# Patient Record
Sex: FEMALE | Race: WHITE | NOT HISPANIC OR LATINO
[De-identification: names, ages, dates, MRNs, and addresses within clinical notes are randomized per-mention and may not be internally consistent; named-entity substitution may affect disease eponyms.]

---
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
## 2019-04-22 NOTE — ED PEDIATRIC NURSE NOTE - THOUGHTS OF HOMICIDE/VIOLENCE TOWARDS OTHERS YN, MLM
Patient's lab order for glycohemoglobin that was ordered on 8-17-18 and expected approximately 8-18-19 was released in error by Mammogram Department.  Replaced lab order.  
No

## 2022-06-19 ENCOUNTER — NON-APPOINTMENT (OUTPATIENT)
Age: 22
End: 2022-06-19

## 2022-07-04 PROBLEM — Z00.00 ENCOUNTER FOR PREVENTIVE HEALTH EXAMINATION: Status: ACTIVE | Noted: 2022-07-04

## 2022-07-05 ENCOUNTER — NON-APPOINTMENT (OUTPATIENT)
Age: 22
End: 2022-07-05

## 2022-07-06 ENCOUNTER — APPOINTMENT (OUTPATIENT)
Dept: OBGYN | Facility: CLINIC | Age: 22
End: 2022-07-06

## 2022-07-06 ENCOUNTER — TRANSCRIPTION ENCOUNTER (OUTPATIENT)
Age: 22
End: 2022-07-06

## 2022-07-06 ENCOUNTER — NON-APPOINTMENT (OUTPATIENT)
Age: 22
End: 2022-07-06

## 2022-07-06 VITALS
BODY MASS INDEX: 21.69 KG/M2 | SYSTOLIC BLOOD PRESSURE: 126 MMHG | DIASTOLIC BLOOD PRESSURE: 79 MMHG | HEIGHT: 66 IN | WEIGHT: 135 LBS

## 2022-07-06 DIAGNOSIS — Z01.419 ENCOUNTER FOR GYNECOLOGICAL EXAMINATION (GENERAL) (ROUTINE) W/OUT ABNORMAL FINDINGS: ICD-10-CM

## 2022-07-06 DIAGNOSIS — Z11.3 ENCOUNTER FOR SCREENING FOR INFECTIONS WITH A PREDOMINANTLY SEXUAL MODE OF TRANSMISSION: ICD-10-CM

## 2022-07-06 PROCEDURE — 99385 PREV VISIT NEW AGE 18-39: CPT

## 2022-07-06 PROCEDURE — 36415 COLL VENOUS BLD VENIPUNCTURE: CPT

## 2022-07-07 LAB
C TRACH RRNA SPEC QL NAA+PROBE: NOT DETECTED
HBV SURFACE AG SER QL: NONREACTIVE
HCV AB SER QL: NONREACTIVE
HCV S/CO RATIO: 0.11 S/CO
HIV1+2 AB SPEC QL IA.RAPID: NONREACTIVE
N GONORRHOEA RRNA SPEC QL NAA+PROBE: NOT DETECTED
SOURCE TP AMPLIFICATION: NORMAL
T PALLIDUM AB SER QL IA: NEGATIVE

## 2022-07-10 LAB — CYTOLOGY CVX/VAG DOC THIN PREP: ABNORMAL

## 2022-07-31 ENCOUNTER — NON-APPOINTMENT (OUTPATIENT)
Age: 22
End: 2022-07-31

## 2023-03-09 ENCOUNTER — OFFICE (OUTPATIENT)
Dept: URBAN - METROPOLITAN AREA CLINIC 27 | Facility: CLINIC | Age: 23
Setting detail: OPHTHALMOLOGY
End: 2023-03-09
Payer: COMMERCIAL

## 2023-03-09 DIAGNOSIS — H16.223: ICD-10-CM

## 2023-03-09 DIAGNOSIS — H40.013: ICD-10-CM

## 2023-03-09 DIAGNOSIS — H52.13: ICD-10-CM

## 2023-03-09 PROCEDURE — 92014 COMPRE OPH EXAM EST PT 1/>: CPT | Performed by: OPHTHALMOLOGY

## 2023-03-09 ASSESSMENT — KERATOMETRY
OD_K2POWER_DIOPTERS: 43.75
METHOD_AUTO_MANUAL: AUTO
OS_K1POWER_DIOPTERS: 42.75
OD_AXISANGLE_DEGREES: 75
OS_K2POWER_DIOPTERS: 43.50
OS_AXISANGLE_DEGREES: 114
OD_K1POWER_DIOPTERS: 43.00

## 2023-03-09 ASSESSMENT — REFRACTION_CURRENTRX
OD_CYLINDER: SPH
OS_VPRISM_DIRECTION: SV
OS_CYLINDER: SPH
OD_OVR_VA: 20/
OD_SPHERE: -2.25
OS_SPHERE: -2.25
OS_OVR_VA: 20/
OD_VPRISM_DIRECTION: SV

## 2023-03-09 ASSESSMENT — REFRACTION_MANIFEST
OS_SPHERE: -2.00
OD_CYLINDER: +0.50
OD_SPHERE: -1.75
OS_CYLINDER: +0.50
OS_CYLINDER: SPH
OS_VA1: 20/15-
OD_AXIS: 15
OD_CYLINDER: SPH
OD_CYLINDER: SPH
OS_AXIS: 150
OS_SPHERE: -2.25
OD_CYLINDER: +0.50
OS_SPHERE: -1.50
OS_CYLINDER: +0.75
OS_SPHERE: -1.50
OD_VA1: 20/15
OD_VA1: 20/15
OD_VA1: 20/15-2
OS_CYLINDER: SPH
OD_SPHERE: -1.75
OS_VA1: 20/15-2
OS_VA1: 20/15-
OS_AXIS: 150
OD_CYLINDER: +0.50
OD_SPHERE: -1.50
OD_AXIS: 015
OD_SPHERE: -1.75
OD_SPHERE: -1.75
OS_AXIS: 130
OD_AXIS: 025
OS_CYLINDER: +0.75
OS_SPHERE: -1.75

## 2023-03-09 ASSESSMENT — REFRACTION_AUTOREFRACTION
OS_AXIS: 144
OS_AXIS: 140
OD_SPHERE: -1.50
OD_SPHERE: -1.50
OS_SPHERE: -1.25
OS_CYLINDER: +0.50
OD_AXIS: 020
OS_CYLINDER: +0.50
OS_SPHERE: -1.25
OD_AXIS: 024
OD_CYLINDER: +0.50
OD_CYLINDER: +0.50

## 2023-03-09 ASSESSMENT — SPHEQUIV_DERIVED
OD_SPHEQUIV: -1.5
OD_SPHEQUIV: -1.25
OS_SPHEQUIV: -1
OD_SPHEQUIV: -1.25
OS_SPHEQUIV: -1.625
OS_SPHEQUIV: -1.125
OS_SPHEQUIV: -1
OS_SPHEQUIV: -1.25
OD_SPHEQUIV: -1.5
OD_SPHEQUIV: -1.5

## 2023-03-09 ASSESSMENT — TONOMETRY
OD_IOP_MMHG: 15
OS_IOP_MMHG: 15

## 2023-03-09 ASSESSMENT — AXIALLENGTH_DERIVED
OD_AL: 24.1371
OS_AL: 24.1313
OS_AL: 24.2336
OD_AL: 24.2395
OD_AL: 24.2395
OS_AL: 24.1824
OS_AL: 24.1313
OD_AL: 24.2395
OS_AL: 24.3888
OD_AL: 24.1371

## 2023-03-09 ASSESSMENT — CONFRONTATIONAL VISUAL FIELD TEST (CVF)
OD_FINDINGS: FULL
OS_FINDINGS: FULL

## 2023-03-09 ASSESSMENT — VISUAL ACUITY
OS_BCVA: 20/20
OD_BCVA: 20/20-1

## 2023-03-09 ASSESSMENT — SUPERFICIAL PUNCTATE KERATITIS (SPK)
OD_SPK: T
OS_SPK: T

## 2023-03-15 ENCOUNTER — OTHER LOCATION (OUTPATIENT)
Dept: URBAN - METROPOLITAN AREA LASIK CENTER 4 | Facility: LASIK CENTER | Age: 23
Setting detail: OPHTHALMOLOGY
End: 2023-03-15

## 2023-03-15 DIAGNOSIS — H52.13: ICD-10-CM

## 2023-03-15 PROCEDURE — 65760 KERATOMILEUSIS: CPT | Performed by: OPHTHALMOLOGY

## 2023-03-17 ENCOUNTER — RX ONLY (RX ONLY)
Age: 23
End: 2023-03-17

## 2023-03-17 ENCOUNTER — OFFICE (OUTPATIENT)
Dept: URBAN - METROPOLITAN AREA CLINIC 27 | Facility: CLINIC | Age: 23
Setting detail: OPHTHALMOLOGY
End: 2023-03-17
Payer: COMMERCIAL

## 2023-03-17 DIAGNOSIS — H40.013: ICD-10-CM

## 2023-03-17 DIAGNOSIS — H16.223: ICD-10-CM

## 2023-03-17 DIAGNOSIS — H52.13: ICD-10-CM

## 2023-03-17 PROCEDURE — 99024 POSTOP FOLLOW-UP VISIT: CPT | Performed by: OPHTHALMOLOGY

## 2023-03-17 ASSESSMENT — REFRACTION_AUTOREFRACTION
OS_CYLINDER: +0.50
OD_CYLINDER: +0.25
OD_SPHERE: +0.25
OS_SPHERE: PLANO
OD_SPHERE: -1.50
OS_CYLINDER: +SPHERE
OD_CYLINDER: +0.50
OS_AXIS: 140
OD_AXIS: 178
OS_SPHERE: -1.25
OD_AXIS: 024

## 2023-03-17 ASSESSMENT — REFRACTION_MANIFEST
OD_CYLINDER: +0.50
OD_CYLINDER: +0.50
OS_VA1: 20/15-
OS_SPHERE: -1.50
OS_SPHERE: -1.75
OS_SPHERE: -1.50
OD_SPHERE: -1.75
OS_AXIS: 130
OS_VA1: 20/15-2
OD_CYLINDER: SPH
OS_CYLINDER: +0.75
OS_SPHERE: -2.00
OS_CYLINDER: SPH
OS_CYLINDER: +0.75
OD_AXIS: 025
OD_SPHERE: -1.75
OD_VA1: 20/15-2
OS_CYLINDER: +0.50
OD_CYLINDER: SPH
OD_AXIS: 015
OS_VA1: 20/15-
OD_CYLINDER: +0.50
OD_SPHERE: -1.75
OS_AXIS: 150
OD_AXIS: 15
OS_AXIS: 150
OD_SPHERE: -1.75
OS_CYLINDER: SPH
OD_SPHERE: -1.50
OD_VA1: 20/15
OS_SPHERE: -2.25
OD_VA1: 20/15

## 2023-03-17 ASSESSMENT — SPHEQUIV_DERIVED
OD_SPHEQUIV: -1.5
OD_SPHEQUIV: -1.5
OS_SPHEQUIV: -1.625
OS_SPHEQUIV: -1
OS_SPHEQUIV: -1.125
OS_SPHEQUIV: -1.25
OD_SPHEQUIV: 0.375
OD_SPHEQUIV: -1.25
OD_SPHEQUIV: -1.5

## 2023-03-17 ASSESSMENT — KERATOMETRY
OS_K2POWER_DIOPTERS: 42.25
OD_K2POWER_DIOPTERS: 41.75
OS_AXISANGLE_DEGREES: 103
OS_K1POWER_DIOPTERS: 41.00
OD_AXISANGLE_DEGREES: 89
OD_K1POWER_DIOPTERS: 41.25
METHOD_AUTO_MANUAL: AUTO

## 2023-03-17 ASSESSMENT — REFRACTION_CURRENTRX
OS_CYLINDER: SPH
OS_OVR_VA: 20/
OS_VPRISM_DIRECTION: SV
OD_SPHERE: -2.25
OD_OVR_VA: 20/
OS_SPHERE: -2.25
OD_CYLINDER: SPH
OD_VPRISM_DIRECTION: SV

## 2023-03-17 ASSESSMENT — AXIALLENGTH_DERIVED
OD_AL: 24.8802
OD_AL: 24.989
OS_AL: 24.7219
OD_AL: 24.1955
OD_AL: 24.989
OS_AL: 24.7755
OD_AL: 24.989
OS_AL: 24.9922
OS_AL: 24.8293

## 2023-03-17 ASSESSMENT — VISUAL ACUITY
OS_BCVA: 20/20
OD_BCVA: 20/20

## 2023-03-17 ASSESSMENT — TONOMETRY
OS_IOP_MMHG: 14
OD_IOP_MMHG: 14

## 2023-03-17 ASSESSMENT — SUPERFICIAL PUNCTATE KERATITIS (SPK)
OD_SPK: T
OS_SPK: T

## 2023-03-23 ENCOUNTER — OFFICE (OUTPATIENT)
Dept: URBAN - METROPOLITAN AREA CLINIC 27 | Facility: CLINIC | Age: 23
Setting detail: OPHTHALMOLOGY
End: 2023-03-23
Payer: COMMERCIAL

## 2023-03-23 DIAGNOSIS — H40.013: ICD-10-CM

## 2023-03-23 DIAGNOSIS — H52.13: ICD-10-CM

## 2023-03-23 DIAGNOSIS — H16.223: ICD-10-CM

## 2023-03-23 PROCEDURE — 99024 POSTOP FOLLOW-UP VISIT: CPT | Performed by: OPHTHALMOLOGY

## 2023-03-23 ASSESSMENT — REFRACTION_AUTOREFRACTION
OD_SPHERE: PLANO
OS_SPHERE: PLANO
OS_AXIS: 159
OD_CYLINDER: +0.50
OD_AXIS: 024
OD_AXIS: 029
OD_SPHERE: -1.50
OS_CYLINDER: +0.50
OS_CYLINDER: +0.50
OS_AXIS: 140
OD_CYLINDER: +0.25
OS_SPHERE: -1.25

## 2023-03-23 ASSESSMENT — REFRACTION_MANIFEST
OD_SPHERE: -1.75
OD_CYLINDER: +0.50
OS_CYLINDER: +0.75
OD_SPHERE: -1.75
OS_CYLINDER: SPH
OS_SPHERE: -1.50
OS_AXIS: 150
OS_VA1: 20/15-
OD_VA1: 20/15
OS_CYLINDER: SPH
OD_VA1: 20/15
OD_SPHERE: -1.50
OS_SPHERE: -2.00
OD_CYLINDER: SPH
OS_VA1: 20/15-
OS_SPHERE: -2.25
OD_SPHERE: -1.75
OD_AXIS: 015
OS_AXIS: 150
OD_AXIS: 025
OD_VA1: 20/15-2
OS_AXIS: 130
OD_AXIS: 15
OS_CYLINDER: +0.75
OD_CYLINDER: +0.50
OD_CYLINDER: SPH
OS_SPHERE: -1.75
OD_SPHERE: -1.75
OS_CYLINDER: +0.50
OS_VA1: 20/15-2
OD_CYLINDER: +0.50
OS_SPHERE: -1.50

## 2023-03-23 ASSESSMENT — AXIALLENGTH_DERIVED
OS_AL: 25.1477
OS_AL: 24.9283
OD_AL: 25.0407
OD_AL: 25.0407
OD_AL: 24.9314
OS_AL: 24.9828
OD_AL: 25.0407
OS_AL: 24.874

## 2023-03-23 ASSESSMENT — KERATOMETRY
OS_K2POWER_DIOPTERS: 41.75
OS_K1POWER_DIOPTERS: 40.75
OD_K2POWER_DIOPTERS: 41.75
OD_K1POWER_DIOPTERS: 41.00
OD_AXISANGLE_DEGREES: 086
OS_AXISANGLE_DEGREES: 118
METHOD_AUTO_MANUAL: AUTO

## 2023-03-23 ASSESSMENT — SPHEQUIV_DERIVED
OS_SPHEQUIV: -1
OS_SPHEQUIV: -1.625
OD_SPHEQUIV: -1.5
OS_SPHEQUIV: -1.125
OD_SPHEQUIV: -1.5
OD_SPHEQUIV: -1.25
OD_SPHEQUIV: -1.5
OS_SPHEQUIV: -1.25

## 2023-03-23 ASSESSMENT — SUPERFICIAL PUNCTATE KERATITIS (SPK)
OS_SPK: T
OD_SPK: T

## 2023-03-23 ASSESSMENT — REFRACTION_CURRENTRX
OS_OVR_VA: 20/
OS_VPRISM_DIRECTION: SV
OS_CYLINDER: SPH
OS_SPHERE: -2.25
OD_SPHERE: -2.25
OD_OVR_VA: 20/
OD_CYLINDER: SPH
OD_VPRISM_DIRECTION: SV

## 2023-03-23 ASSESSMENT — VISUAL ACUITY
OD_BCVA: 20/20
OS_BCVA: 20/20

## 2023-03-23 ASSESSMENT — TONOMETRY
OD_IOP_MMHG: 12
OS_IOP_MMHG: 13
OD_IOP_MMHG: 14
OS_IOP_MMHG: 12

## 2023-04-19 ENCOUNTER — OFFICE (OUTPATIENT)
Dept: URBAN - METROPOLITAN AREA CLINIC 27 | Facility: CLINIC | Age: 23
Setting detail: OPHTHALMOLOGY
End: 2023-04-19
Payer: COMMERCIAL

## 2023-04-19 DIAGNOSIS — H16.223: ICD-10-CM

## 2023-04-19 DIAGNOSIS — H52.13: ICD-10-CM

## 2023-04-19 DIAGNOSIS — H40.013: ICD-10-CM

## 2023-04-19 PROCEDURE — 99024 POSTOP FOLLOW-UP VISIT: CPT | Performed by: OPHTHALMOLOGY

## 2023-04-19 ASSESSMENT — REFRACTION_AUTOREFRACTION
OD_CYLINDER: +0.50
OS_CYLINDER: +0.25
OS_AXIS: 121
OD_AXIS: 024
OD_SPHERE: -1.50
OD_AXIS: 59
OS_SPHERE: PLANO
OD_SPHERE: -0.25
OS_SPHERE: -1.25
OS_CYLINDER: +0.50
OS_AXIS: 140
OD_CYLINDER: +0.25

## 2023-04-19 ASSESSMENT — REFRACTION_MANIFEST
OD_CYLINDER: SPH
OS_SPHERE: -2.00
OD_AXIS: 15
OD_CYLINDER: +0.50
OS_VA1: 20/15-
OD_AXIS: 015
OS_VA1: 20/15-
OD_AXIS: 025
OD_SPHERE: -1.75
OS_AXIS: 150
OD_CYLINDER: +0.50
OD_VA1: 20/15
OS_CYLINDER: +0.75
OS_AXIS: 150
OS_CYLINDER: SPH
OD_SPHERE: -1.75
OD_VA1: 20/15-2
OD_SPHERE: -1.75
OS_VA1: 20/15-2
OS_SPHERE: -1.50
OD_SPHERE: -1.50
OS_CYLINDER: SPH
OD_CYLINDER: +0.50
OS_SPHERE: -2.25
OS_SPHERE: -1.75
OD_VA1: 20/15
OS_CYLINDER: +0.50
OS_AXIS: 130
OD_SPHERE: -1.75
OD_CYLINDER: SPH
OS_SPHERE: -1.50
OS_CYLINDER: +0.75

## 2023-04-19 ASSESSMENT — SPHEQUIV_DERIVED
OD_SPHEQUIV: -1.25
OS_SPHEQUIV: -1.25
OS_SPHEQUIV: -1.125
OS_SPHEQUIV: -1
OD_SPHEQUIV: -1.5
OD_SPHEQUIV: -0.125
OS_SPHEQUIV: -1.625

## 2023-04-19 ASSESSMENT — VISUAL ACUITY
OD_BCVA: 20/20
OS_BCVA: 20/20

## 2023-04-19 ASSESSMENT — REFRACTION_CURRENTRX
OS_SPHERE: -2.25
OD_CYLINDER: SPH
OS_CYLINDER: SPH
OD_OVR_VA: 20/
OS_OVR_VA: 20/
OD_SPHERE: -2.25
OS_VPRISM_DIRECTION: SV
OD_VPRISM_DIRECTION: SV

## 2023-04-19 ASSESSMENT — AXIALLENGTH_DERIVED
OS_AL: 24.7785
OD_AL: 24.3043
OS_AL: 24.9407
OD_AL: 24.8864
OS_AL: 24.6715
OD_AL: 24.7785
OS_AL: 24.7249

## 2023-04-19 ASSESSMENT — KERATOMETRY
METHOD_AUTO_MANUAL: AUTO
OD_K2POWER_DIOPTERS: 42.25
OD_AXISANGLE_DEGREES: 82
OS_K1POWER_DIOPTERS: 41.25
OS_AXISANGLE_DEGREES: 108
OD_K1POWER_DIOPTERS: 41.25
OS_K2POWER_DIOPTERS: 42.25

## 2023-04-19 ASSESSMENT — TONOMETRY
OS_IOP_MMHG: 11
OD_IOP_MMHG: 10
OD_IOP_MMHG: 10
OS_IOP_MMHG: 10

## 2023-04-19 ASSESSMENT — SUPERFICIAL PUNCTATE KERATITIS (SPK)
OD_SPK: T
OS_SPK: T

## 2023-09-16 ENCOUNTER — NON-APPOINTMENT (OUTPATIENT)
Age: 23
End: 2023-09-16

## 2023-10-16 ENCOUNTER — OFFICE (OUTPATIENT)
Dept: URBAN - METROPOLITAN AREA CLINIC 27 | Facility: CLINIC | Age: 23
Setting detail: OPHTHALMOLOGY
End: 2023-10-16
Payer: COMMERCIAL

## 2023-10-16 DIAGNOSIS — H16.223: ICD-10-CM

## 2023-10-16 DIAGNOSIS — H52.13: ICD-10-CM

## 2023-10-16 DIAGNOSIS — H40.013: ICD-10-CM

## 2023-10-16 PROCEDURE — 99024 POSTOP FOLLOW-UP VISIT: CPT | Performed by: OPHTHALMOLOGY

## 2023-10-16 ASSESSMENT — REFRACTION_MANIFEST
OD_AXIS: 025
OD_AXIS: 015
OS_VA1: 20/15-2
OS_SPHERE: -2.00
OD_CYLINDER: +0.50
OD_CYLINDER: +0.50
OS_CYLINDER: +0.50
OD_SPHERE: -1.50
OD_CYLINDER: SPH
OS_VA1: 20/15-
OS_CYLINDER: SPH
OS_SPHERE: -1.75
OD_SPHERE: -1.75
OD_SPHERE: -1.75
OD_CYLINDER: SPH
OS_VA1: 20/15-
OS_AXIS: 130
OS_CYLINDER: SPH
OD_AXIS: 15
OS_SPHERE: -1.50
OS_CYLINDER: +0.75
OD_VA1: 20/15
OD_CYLINDER: +0.50
OD_VA1: 20/15-2
OS_AXIS: 150
OD_SPHERE: -1.75
OS_AXIS: 150
OD_VA1: 20/15
OS_SPHERE: -1.50
OD_SPHERE: -1.75
OS_SPHERE: -2.25
OS_CYLINDER: +0.75

## 2023-10-16 ASSESSMENT — REFRACTION_AUTOREFRACTION
OD_AXIS: 024
OD_SPHERE: -1.50
OS_SPHERE: -1.25
OS_CYLINDER: +0.25
OS_SPHERE: -0.50
OS_AXIS: 140
OD_CYLINDER: SPH
OD_CYLINDER: +0.50
OS_CYLINDER: +0.50
OS_AXIS: 107
OD_SPHERE: PLANO

## 2023-10-16 ASSESSMENT — SPHEQUIV_DERIVED
OD_SPHEQUIV: -1.5
OD_SPHEQUIV: -1.5
OS_SPHEQUIV: -1
OS_SPHEQUIV: -1.125
OS_SPHEQUIV: -1.25
OD_SPHEQUIV: -1.25
OS_SPHEQUIV: -0.375
OD_SPHEQUIV: -1.5
OS_SPHEQUIV: -1.625

## 2023-10-16 ASSESSMENT — REFRACTION_CURRENTRX
OS_SPHERE: -2.25
OS_CYLINDER: SPH
OS_OVR_VA: 20/
OD_SPHERE: -2.25
OD_CYLINDER: SPH
OD_OVR_VA: 20/
OS_VPRISM_DIRECTION: SV
OD_VPRISM_DIRECTION: SV

## 2023-10-16 ASSESSMENT — AXIALLENGTH_DERIVED
OD_AL: 24.8864
OS_AL: 24.8895
OS_AL: 24.6746
OD_AL: 24.8864
OS_AL: 24.6214
OD_AL: 24.8864
OD_AL: 24.7785
OS_AL: 24.728
OS_AL: 24.359

## 2023-10-16 ASSESSMENT — TONOMETRY
OD_IOP_MMHG: 10
OS_IOP_MMHG: 14

## 2023-10-16 ASSESSMENT — VISUAL ACUITY
OS_BCVA: 20/15-
OD_BCVA: 20/15-2

## 2023-10-16 ASSESSMENT — SUPERFICIAL PUNCTATE KERATITIS (SPK)
OD_SPK: ABSENT
OS_SPK: ABSENT

## 2023-10-16 ASSESSMENT — KERATOMETRY
OS_K2POWER_DIOPTERS: 42.50
METHOD_AUTO_MANUAL: AUTO
OD_K2POWER_DIOPTERS: 42.25
OS_AXISANGLE_DEGREES: 106
OS_K1POWER_DIOPTERS: 41.25
OD_K1POWER_DIOPTERS: 41.25
OD_AXISANGLE_DEGREES: 086

## 2023-12-17 ENCOUNTER — NON-APPOINTMENT (OUTPATIENT)
Age: 23
End: 2023-12-17

## 2024-02-05 ENCOUNTER — OFFICE (OUTPATIENT)
Dept: URBAN - METROPOLITAN AREA CLINIC 27 | Facility: CLINIC | Age: 24
Setting detail: OPHTHALMOLOGY
End: 2024-02-05
Payer: COMMERCIAL

## 2024-02-05 DIAGNOSIS — H52.13: ICD-10-CM

## 2024-02-05 DIAGNOSIS — H16.223: ICD-10-CM

## 2024-02-05 DIAGNOSIS — H40.013: ICD-10-CM

## 2024-02-05 PROCEDURE — 99024 POSTOP FOLLOW-UP VISIT: CPT | Performed by: OPHTHALMOLOGY

## 2024-02-05 ASSESSMENT — REFRACTION_AUTOREFRACTION
OS_CYLINDER: +0.50
OS_AXIS: 153
OD_SPHERE: PLANO
OD_AXIS: 024
OS_CYLINDER: +0.25
OD_AXIS: 148
OS_SPHERE: -1.25
OS_SPHERE: PLANO
OD_SPHERE: -1.50
OS_AXIS: 140
OD_CYLINDER: +0.50
OD_CYLINDER: +0.25

## 2024-02-05 ASSESSMENT — REFRACTION_MANIFEST
OS_CYLINDER: +0.75
OD_SPHERE: -1.75
OS_CYLINDER: SPH
OS_SPHERE: -1.75
OD_VA1: 20/15
OS_SPHERE: -2.00
OD_CYLINDER: +0.50
OD_CYLINDER: SPH
OD_VA1: 20/15-2
OD_SPHERE: -1.75
OD_VA1: 20/15
OD_AXIS: 15
OD_CYLINDER: SPH
OS_AXIS: 150
OS_CYLINDER: +0.50
OS_VA1: 20/15-2
OD_AXIS: 015
OD_CYLINDER: +0.50
OD_SPHERE: -1.75
OD_AXIS: 025
OS_SPHERE: -1.50
OS_VA1: 20/15-
OS_SPHERE: -1.50
OS_AXIS: 130
OS_VA1: 20/15-
OD_SPHERE: -1.50
OS_SPHERE: -2.25
OD_SPHERE: -1.75
OD_CYLINDER: +0.50
OS_CYLINDER: SPH
OS_AXIS: 150
OS_CYLINDER: +0.75

## 2024-02-05 ASSESSMENT — SUPERFICIAL PUNCTATE KERATITIS (SPK)
OD_SPK: ABSENT
OS_SPK: ABSENT

## 2024-02-05 ASSESSMENT — REFRACTION_CURRENTRX
OS_VPRISM_DIRECTION: SV
OD_VPRISM_DIRECTION: SV
OD_SPHERE: -2.25
OS_CYLINDER: SPH
OD_OVR_VA: 20/
OS_OVR_VA: 20/
OS_SPHERE: -2.25
OD_CYLINDER: SPH

## 2024-02-05 ASSESSMENT — SPHEQUIV_DERIVED
OS_SPHEQUIV: -1.25
OS_SPHEQUIV: -1
OS_SPHEQUIV: -1.125
OD_SPHEQUIV: -1.5
OD_SPHEQUIV: -1.5
OD_SPHEQUIV: -1.25
OD_SPHEQUIV: -1.5
OS_SPHEQUIV: -1.625

## 2025-01-24 ENCOUNTER — NON-APPOINTMENT (OUTPATIENT)
Age: 25
End: 2025-01-24

## 2025-05-20 ENCOUNTER — APPOINTMENT (OUTPATIENT)
Dept: OBGYN | Facility: CLINIC | Age: 25
End: 2025-05-20
Payer: COMMERCIAL

## 2025-05-20 ENCOUNTER — NON-APPOINTMENT (OUTPATIENT)
Age: 25
End: 2025-05-20

## 2025-05-20 VITALS
BODY MASS INDEX: 21.86 KG/M2 | WEIGHT: 136 LBS | SYSTOLIC BLOOD PRESSURE: 124 MMHG | DIASTOLIC BLOOD PRESSURE: 84 MMHG | HEIGHT: 66 IN

## 2025-05-20 DIAGNOSIS — Z01.419 ENCOUNTER FOR GYNECOLOGICAL EXAMINATION (GENERAL) (ROUTINE) W/OUT ABNORMAL FINDINGS: ICD-10-CM

## 2025-05-20 PROCEDURE — 99395 PREV VISIT EST AGE 18-39: CPT

## 2025-05-20 PROCEDURE — 99459 PELVIC EXAMINATION: CPT | Mod: NC

## 2025-05-20 PROCEDURE — G0444 DEPRESSION SCREEN ANNUAL: CPT | Mod: 59

## 2025-05-21 LAB
C TRACH RRNA SPEC QL NAA+PROBE: NOT DETECTED
N GONORRHOEA RRNA SPEC QL NAA+PROBE: NOT DETECTED
SOURCE TP AMPLIFICATION: NORMAL

## 2025-05-23 LAB — CYTOLOGY CVX/VAG DOC THIN PREP: NORMAL
